# Patient Record
Sex: FEMALE | NOT HISPANIC OR LATINO | Employment: FULL TIME | URBAN - METROPOLITAN AREA
[De-identification: names, ages, dates, MRNs, and addresses within clinical notes are randomized per-mention and may not be internally consistent; named-entity substitution may affect disease eponyms.]

---

## 2021-02-16 ENCOUNTER — VIRTUAL VISIT (OUTPATIENT)
Dept: DERMATOLOGY | Facility: CLINIC | Age: 36
End: 2021-02-16
Payer: COMMERCIAL

## 2021-02-16 DIAGNOSIS — L81.1 MELASMA: Primary | ICD-10-CM

## 2021-02-16 PROCEDURE — 99204 OFFICE O/P NEW MOD 45 MIN: CPT | Mod: 95 | Performed by: DERMATOLOGY

## 2021-02-16 RX ORDER — HYDROQUINONE 40 MG/G
CREAM TOPICAL
Qty: 28.35 G | Refills: 3 | Status: SHIPPED | OUTPATIENT
Start: 2021-02-16 | End: 2023-01-25

## 2021-02-16 ASSESSMENT — PAIN SCALES - GENERAL: PAINLEVEL: NO PAIN (0)

## 2021-02-16 NOTE — LETTER
2/16/2021         RE: Richard Dudley  [address]  [facility] [address]        Dear Colleague,    Thank you for referring your patient, Richard Dudley, to the [facility]. Please see a copy of my visit note below.              [facility] Dermatology Note - [phone]  Encounter Date: Feb 16, 2021  Store-and-Forward and Telephone. Location of teledermatologist: [facility].  Start time: 2:27PM. End time: 2:37PM.    Dermatology Problem List:  1. Melasma  -current tx: sunscreen, hydroquinone 4% cream BID 3 months on, one month off, hold if pregnant    Social history: Has nearly 2 year old daughter. Planning for more kids in the future but not soon.    ____________________________________________    Assessment & Plan:  # Melasma - counseled patient on the etiology of melasma. Chronic condition, currently flared with no treatment. Advised patient to switch to physical blocker sunscreen, ideally with iron oxide. Also advised to stop hydroquinone if she becomes pregnant.   - Apply Skinceuticals Fusion sunscreen daily. In the summer, reapply frequently.   - Start hydroquinone 4% cream twice daily to the cheeks for 3 months, then stop and take a one month break. Okay to repeat cycle if needed. Hold if prengant, not safe.   - Discussed chronic nature of melasma. There is no cure but can be improved with treatment.       Procedures Performed:   None.    Follow-up: prn for new or changing lesions    Staff and Scribe:     Scribe Disclosure:   I, Varun Mccallum, am serving as a scribe to document services personally performed by this physician, Dr. Niki Rivera, based on data collection and the provider's statements to me.     Provider Disclosure:   The documentation recorded by the scribe accurately reflects the services I personally performed and the decisions made by me.    Niki Rivera MD    Department of  Dermatology  [facility]: Phone: [phone], Fax:[phone]  [facility]: Phone: [phone], Fax: [phone]    ____________________________________________    CC: Derm Problem (Richard is having a consult for dark patches on her face, showed up around month 5 of pregnancy currently, no medications or creams. )    HPI:  Ms. Richard Dudley is a(n) 35 year old female who presents today as a new patient for dark patches on her face.    She is new to our department.     Self referred.    Today, patient notes concerns about dark patches on her face that showed up during pregnancy. Her child is 22 months old, and she notes this occurred during her 5th month of pregnancy. She is planning to have more children in the future, but is planning to wait a bit before pregnancy. She has not noticed similar areas on the chin. She does use Derm[employer]ica Sun Screen, the dermatologic dynamic skin recovery type.    Patient is otherwise feeling well, without additional concerns.    Labs:  NA    Physical Exam:  Vitals: There were no vitals taken for this visit.  SKIN: Teledermatology photos were reviewed; image quality and interpretability: acceptable.   - reticular hyperpigmentation on the bilateral cheeks  - No other lesions of concern on areas examined.     Medications:  No current outpatient medications on file.     No current facility-administered medications for this visit.       Past Medical History:   There is no problem list on file for this patient.    No past medical history on file.     CC No referring provider defined for this encounter. on close of this encounter.    Teledermatology Nurse Call Patients:     Are you  in the [facility] at the time of the encounter? yes    Today's visit will be billed to you and your insurance.    A teledermatology visit is not as thorough as an in-person visit and the quality of  the photograph sent may not be of the same quality as that taken by the dermatology clinic.      Patient summary of issue : Dark patches on face  Location of problem on body: Face  How long has area/symptoms been present: almost two years  What makes it better?:nothing   What makes it worse?: nothing makes it worse   Other symptoms include the following: just darkening of the skin   Which medications have been tried, for how long, and did they make it better or worse (ex. Triamcinolone, used twice daily for 2 weeks, not improved):n/a  The patient has not seen a dermatologist.   The patient hasno past medical history of skin cancer  ROS: The patient is generally feeling well.               Again, thank you for allowing me to participate in the care of your patient.        Sincerely,        Niki Rivera MD

## 2021-02-16 NOTE — NURSING NOTE
Richard Dudley's goals for this visit include:   Chief Complaint   Patient presents with     Derm Problem     Richard is having a consult for dark patches on her face, showed up around month 5 of pregnancy currently, no medications or creams.        She requests these members of her care team be copied on today's visit information:     PCP: No Ref-Primary, Physician    Referring Provider:  No referring provider defined for this encounter.    There were no vitals taken for this visit.    Do you need any medication refills at today's visit? No    Roxy Flores LPN

## 2021-02-16 NOTE — PROGRESS NOTES
[facility] Dermatology Note - [phone]  Encounter Date: Feb 16, 2021  Store-and-Forward and Telephone. Location of teledermatologist: [facility].  Start time: 2:27PM. End time: 2:37PM.    Dermatology Problem List:  1. Melasma  -current tx: sunscreen, hydroquinone 4% cream BID 3 months on, one month off, hold if pregnant    Social history: Has nearly 2 year old daughter. Planning for more kids in the future but not soon.    ____________________________________________    Assessment & Plan:  # Melasma - counseled patient on the etiology of melasma. Chronic condition, currently flared with no treatment. Advised patient to switch to physical blocker sunscreen, ideally with iron oxide. Also advised to stop hydroquinone if she becomes pregnant.   - Apply Skinceuticals Fusion sunscreen daily. In the summer, reapply frequently.   - Start hydroquinone 4% cream twice daily to the cheeks for 3 months, then stop and take a one month break. Okay to repeat cycle if needed. Hold if prengant, not safe.   - Discussed chronic nature of melasma. There is no cure but can be improved with treatment.       Procedures Performed:   None.    Follow-up: prn for new or changing lesions    Staff and Scribe:     Scribe Disclosure:   I, Varun Mccallum, am serving as a scribe to document services personally performed by this physician, Dr. Niki Rivera, based on data collection and the provider's statements to me.     Provider Disclosure:   The documentation recorded by the scribe accurately reflects the services I personally performed and the decisions made by me.    Niki Rivera MD    Department of Dermatology  [facility]: Phone: [phone], Fax:[phone]  [facility]: Phone: [phone], Fax:  [phone]    ____________________________________________    CC: Derm Problem (Richard is having a consult for dark patches on her face, showed up around month 5 of pregnancy currently, no medications or creams. )    HPI:  Ms. Richard Dudley is a(n) 35 year old female who presents today as a new patient for dark patches on her face.    She is new to our department.     Self referred.    Today, patient notes concerns about dark patches on her face that showed up during pregnancy. Her child is 22 months old, and she notes this occurred during her 5th month of pregnancy. She is planning to have more children in the future, but is planning to wait a bit before pregnancy. She has not noticed similar areas on the chin. She does use Dermologica Sun Screen, the dermatologic dynamic skin recovery type.    Patient is otherwise feeling well, without additional concerns.    Labs:  NA    Physical Exam:  Vitals: There were no vitals taken for this visit.  SKIN: Teledermatology photos were reviewed; image quality and interpretability: acceptable.   - reticular hyperpigmentation on the bilateral cheeks  - No other lesions of concern on areas examined.     Medications:  No current outpatient medications on file.     No current facility-administered medications for this visit.       Past Medical History:   There is no problem list on file for this patient.    No past medical history on file.     CC No referring provider defined for this encounter. on close of this encounter.    Teledermatology Nurse Call Patients:     Are you  in the [facility] at the time of the encounter? yes    Today's visit will be billed to you and your insurance.    A teledermatology visit is not as thorough as an in-person visit and the quality of the photograph sent may not be of the same quality as that taken by the dermatology clinic.      Patient summary of issue : Dark patches on face  Location of problem on body: Face  How long has area/symptoms  been present: almost two years  What makes it better?:nothing   What makes it worse?: nothing makes it worse   Other symptoms include the following: just darkening of the skin   Which medications have been tried, for how long, and did they make it better or worse (ex. Triamcinolone, used twice daily for 2 weeks, not improved):n/a  The patient has not seen a dermatologist.   The patient hasno past medical history of skin cancer  ROS: The patient is generally feeling well.

## 2021-02-16 NOTE — Clinical Note
Please call patient to tell her how to use [employer] to get cheaper price on hydroquinone at [facility].    Thanks!    Niki Rivera MD    Department of Dermatology  [facility]: Phone: [phone], Fax:[phone]  [facility]: Phone: [phone], Fax: [phone]

## 2021-02-16 NOTE — PATIENT INSTRUCTIONS
-Apply sunscreen daily. I recommend using Skinceuticals Fusion sunscreen, available from www.dermstore.com    -Start hydroquinone twice daily to the cheeks for 3 months, then stop for one month. You can repeat this cycle as needed. If you become pregnant, stop using hydroquinone. This is likely not covered by insurance. You can access [employer] to get this for around $40.

## 2021-02-17 ENCOUNTER — TELEPHONE (OUTPATIENT)
Dept: DERMATOLOGY | Facility: CLINIC | Age: 36
End: 2021-02-17

## 2021-02-17 NOTE — TELEPHONE ENCOUNTER
Received fax from [employer] requesting a PA be initiated on Hydroquinone. Due to insurance typically not covering this medication pt to be advised to go to [employer] website and print off a discount coupon to help with the cost of this medication. Pt called, no answer. Left general message for pt to call the clinic back at [phone]...Lisette Huertas RN

## 2021-02-17 NOTE — TELEPHONE ENCOUNTER
Pt returned the call and notified of message below. Pt verbalized understanding and had no further questions at this time...Lisette Huertas RN

## 2021-02-28 ENCOUNTER — HEALTH MAINTENANCE LETTER (OUTPATIENT)
Age: 36
End: 2021-02-28

## 2021-10-03 ENCOUNTER — HEALTH MAINTENANCE LETTER (OUTPATIENT)
Age: 36
End: 2021-10-03

## 2022-03-20 ENCOUNTER — HEALTH MAINTENANCE LETTER (OUTPATIENT)
Age: 37
End: 2022-03-20

## 2022-09-11 ENCOUNTER — HEALTH MAINTENANCE LETTER (OUTPATIENT)
Age: 37
End: 2022-09-11

## 2023-01-25 ENCOUNTER — OFFICE VISIT (OUTPATIENT)
Dept: FAMILY MEDICINE | Facility: CLINIC | Age: 38
End: 2023-01-25
Payer: COMMERCIAL

## 2023-01-25 VITALS
HEART RATE: 73 BPM | SYSTOLIC BLOOD PRESSURE: 113 MMHG | WEIGHT: 106.8 LBS | DIASTOLIC BLOOD PRESSURE: 78 MMHG | TEMPERATURE: 98.4 F | OXYGEN SATURATION: 100 % | BODY MASS INDEX: 20.16 KG/M2 | HEIGHT: 61 IN | RESPIRATION RATE: 18 BRPM

## 2023-01-25 DIAGNOSIS — Z12.4 CERVICAL CANCER SCREENING: ICD-10-CM

## 2023-01-25 DIAGNOSIS — R53.83 FATIGUE, UNSPECIFIED TYPE: ICD-10-CM

## 2023-01-25 DIAGNOSIS — E55.9 VITAMIN D DEFICIENCY: ICD-10-CM

## 2023-01-25 DIAGNOSIS — Z00.00 ROUTINE HISTORY AND PHYSICAL EXAMINATION OF ADULT: Primary | ICD-10-CM

## 2023-01-25 DIAGNOSIS — R79.0 LOW FERRITIN: ICD-10-CM

## 2023-01-25 DIAGNOSIS — Z11.59 NEED FOR HEPATITIS C SCREENING TEST: ICD-10-CM

## 2023-01-25 DIAGNOSIS — Z28.20 VACCINE REFUSED BY PATIENT: ICD-10-CM

## 2023-01-25 LAB
ALBUMIN SERPL-MCNC: 3.8 G/DL (ref 3.4–5)
ALP SERPL-CCNC: 55 U/L (ref 40–150)
ALT SERPL W P-5'-P-CCNC: 18 U/L (ref 0–50)
ANION GAP SERPL CALCULATED.3IONS-SCNC: 3 MMOL/L (ref 3–14)
AST SERPL W P-5'-P-CCNC: 22 U/L (ref 0–45)
BILIRUB SERPL-MCNC: 0.3 MG/DL (ref 0.2–1.3)
BUN SERPL-MCNC: 14 MG/DL (ref 7–30)
CALCIUM SERPL-MCNC: 8.6 MG/DL (ref 8.5–10.1)
CHLORIDE BLD-SCNC: 110 MMOL/L (ref 94–109)
CHOLEST SERPL-MCNC: 188 MG/DL
CO2 SERPL-SCNC: 28 MMOL/L (ref 20–32)
CREAT SERPL-MCNC: 0.47 MG/DL (ref 0.52–1.04)
ERYTHROCYTE [DISTWIDTH] IN BLOOD BY AUTOMATED COUNT: 13.5 % (ref 10–15)
FASTING STATUS PATIENT QL REPORTED: NO
FERRITIN SERPL-MCNC: 11 NG/ML (ref 12–150)
GFR SERPL CREATININE-BSD FRML MDRD: >90 ML/MIN/1.73M2
GLUCOSE BLD-MCNC: 92 MG/DL (ref 70–99)
HCT VFR BLD AUTO: 34.9 % (ref 35–47)
HDLC SERPL-MCNC: 70 MG/DL
HGB BLD-MCNC: 11.5 G/DL (ref 11.7–15.7)
LDLC SERPL CALC-MCNC: 108 MG/DL
MCH RBC QN AUTO: 28.5 PG (ref 26.5–33)
MCHC RBC AUTO-ENTMCNC: 33 G/DL (ref 31.5–36.5)
MCV RBC AUTO: 86 FL (ref 78–100)
NONHDLC SERPL-MCNC: 118 MG/DL
PLATELET # BLD AUTO: 294 10E3/UL (ref 150–450)
POTASSIUM BLD-SCNC: 3.7 MMOL/L (ref 3.4–5.3)
PROT SERPL-MCNC: 7.6 G/DL (ref 6.8–8.8)
RBC # BLD AUTO: 4.04 10E6/UL (ref 3.8–5.2)
SODIUM SERPL-SCNC: 141 MMOL/L (ref 133–144)
TRIGL SERPL-MCNC: 50 MG/DL
TSH SERPL DL<=0.005 MIU/L-ACNC: 3.38 MU/L (ref 0.4–4)
VIT B12 SERPL-MCNC: 480 PG/ML (ref 232–1245)
WBC # BLD AUTO: 3.3 10E3/UL (ref 4–11)

## 2023-01-25 PROCEDURE — 85027 COMPLETE CBC AUTOMATED: CPT | Performed by: PREVENTIVE MEDICINE

## 2023-01-25 PROCEDURE — 84443 ASSAY THYROID STIM HORMONE: CPT | Performed by: PREVENTIVE MEDICINE

## 2023-01-25 PROCEDURE — 99385 PREV VISIT NEW AGE 18-39: CPT | Performed by: PREVENTIVE MEDICINE

## 2023-01-25 PROCEDURE — 87624 HPV HI-RISK TYP POOLED RSLT: CPT | Performed by: PREVENTIVE MEDICINE

## 2023-01-25 PROCEDURE — 36415 COLL VENOUS BLD VENIPUNCTURE: CPT | Performed by: PREVENTIVE MEDICINE

## 2023-01-25 PROCEDURE — 80053 COMPREHEN METABOLIC PANEL: CPT | Performed by: PREVENTIVE MEDICINE

## 2023-01-25 PROCEDURE — 82607 VITAMIN B-12: CPT | Performed by: PREVENTIVE MEDICINE

## 2023-01-25 PROCEDURE — 82728 ASSAY OF FERRITIN: CPT | Performed by: PREVENTIVE MEDICINE

## 2023-01-25 PROCEDURE — G0145 SCR C/V CYTO,THINLAYER,RESCR: HCPCS | Performed by: PREVENTIVE MEDICINE

## 2023-01-25 PROCEDURE — 80061 LIPID PANEL: CPT | Performed by: PREVENTIVE MEDICINE

## 2023-01-25 PROCEDURE — 82306 VITAMIN D 25 HYDROXY: CPT | Performed by: PREVENTIVE MEDICINE

## 2023-01-25 PROCEDURE — 86803 HEPATITIS C AB TEST: CPT | Performed by: PREVENTIVE MEDICINE

## 2023-01-25 PROCEDURE — 99203 OFFICE O/P NEW LOW 30 MIN: CPT | Mod: 25 | Performed by: PREVENTIVE MEDICINE

## 2023-01-25 ASSESSMENT — ENCOUNTER SYMPTOMS
EYE PAIN: 0
CHILLS: 0
MYALGIAS: 1
NAUSEA: 0
HEMATOCHEZIA: 0
WEAKNESS: 1
ARTHRALGIAS: 0
COUGH: 0
SHORTNESS OF BREATH: 0
FREQUENCY: 0
FEVER: 0
JOINT SWELLING: 0
DIZZINESS: 1
BREAST MASS: 0
SORE THROAT: 0
DYSURIA: 0
HEADACHES: 1
DIARRHEA: 0
NERVOUS/ANXIOUS: 1
ABDOMINAL PAIN: 0
HEARTBURN: 0
HEMATURIA: 0
PALPITATIONS: 0

## 2023-01-25 ASSESSMENT — PAIN SCALES - GENERAL: PAINLEVEL: NO PAIN (0)

## 2023-01-25 NOTE — PROGRESS NOTES
SUBJECTIVE:   CC: Richard is an 37 year old who presents for preventive health visit.   Patient has been advised of split billing requirements and indicates understanding: Yes  Healthy Habits:     Getting at least 3 servings of Calcium per day:  Yes    Bi-annual eye exam:  NO    Dental care twice a year:  Yes    Sleep apnea or symptoms of sleep apnea:  None    Diet:  Regular (no restrictions)    Frequency of exercise:  None    Taking medications regularly:  Yes    Medication side effects:  None    PHQ-2 Total Score: 2    Additional concerns today:  No        Fatigue:  -main reason for visit  -for a couple of years  -not enough sleep at night  -one child  -baby is 3.5 years   -Has had hair loss   -no new bowel changes  -no new joint pains  -no melena or rectal bleeding  -no fever or night sweats     Today's PHQ-2 Score:   PHQ-2 ( 1999 [employer]) 1/25/2023   Q1: Little interest or pleasure in doing things 1   Q2: Feeling down, depressed or hopeless 1   PHQ-2 Score 2   PHQ-2 Total Score (12-17 Years)- Positive if 3 or more points; Administer PHQ-A if positive -   Q1: Little interest or pleasure in doing things Several days   Q2: Feeling down, depressed or hopeless Several days   PHQ-2 Score 2       Have you ever done Advance Care Planning? (For example, a Health Directive, POLST, or a discussion with a medical provider or your loved ones about your wishes): No, advance care planning information given to patient to review.  Patient declined advance care planning discussion at this time.    Social History     Tobacco Use     Smoking status: Never     Smokeless tobacco: Never   Substance Use Topics     Alcohol use: No     If you drink alcohol do you typically have >3 drinks per day or >7 drinks per week? No    Alcohol Use 1/25/2023   Prescreen: >3 drinks/day or >7 drinks/week? No       Reviewed orders with patient.  Reviewed health maintenance and updated orders accordingly - Yes  Lab work is in process  Labs reviewed in  EPIC  BP Readings from Last 3 Encounters:   01/25/23 113/78   12/29/14 108/68    Wt Readings from Last 3 Encounters:   01/25/23 48.4 kg (106 lb 12.8 oz)   12/29/14 46 kg (101 lb 6.4 oz)                  There is no problem list on file for this patient.    History reviewed. No pertinent surgical history.    Social History     Tobacco Use     Smoking status: Never     Smokeless tobacco: Never   Substance Use Topics     Alcohol use: No     History reviewed. No pertinent family history.      Current Outpatient Medications   Medication Sig Dispense Refill     vitamin D3 (CHOLECALCIFEROL) 1.25 MG (15499 UT) capsule Take 1 capsule (50,000 Units) by mouth every 7 days for 8 doses 8 capsule 0     No Known Allergies    Breast Cancer Screening:  Any new diagnosis of family breast, ovarian, or bowel cancer? No    FHS-7: No flowsheet data found.    Patient under 40 years of age: Routine Mammogram Screening not recommended.   Pertinent mammograms are reviewed under the imaging tab.    History of abnormal Pap smear: NO - age 30-65 PAP every 5 years with negative HPV co-testing recommended     Reviewed and updated as needed this visit by clinical staff   Tobacco  Allergies  Meds  Problems  Med Hx  Surg Hx  Fam Hx          Reviewed and updated as needed this visit by Provider   Tobacco  Allergies  Meds  Problems  Med Hx  Surg Hx  Fam Hx         History reviewed. No pertinent past medical history.   History reviewed. No pertinent surgical history.    Review of Systems   Constitutional: Negative for chills and fever.   HENT: Positive for ear pain. Negative for congestion, hearing loss and sore throat.    Eyes: Negative for pain and visual disturbance.   Respiratory: Negative for cough and shortness of breath.    Cardiovascular: Negative for chest pain, palpitations and peripheral edema.   Gastrointestinal: Negative for abdominal pain, diarrhea, heartburn, hematochezia and nausea.   Breasts:  Negative for tenderness,  "breast mass and discharge.   Genitourinary: Negative for dysuria, frequency, genital sores, hematuria, pelvic pain, urgency, vaginal bleeding and vaginal discharge.   Musculoskeletal: Positive for myalgias. Negative for arthralgias and joint swelling.   Skin: Negative for rash.   Neurological: Positive for dizziness, weakness and headaches.   Psychiatric/Behavioral: Positive for mood changes. The patient is nervous/anxious.         OBJECTIVE:   /78 (BP Location: Left arm, Patient Position: Sitting, Cuff Size: Adult Regular)   Pulse 73   Temp 98.4  F (36.9  C) (Tympanic)   Resp 18   Ht 1.549 m (5' 1\")   Wt 48.4 kg (106 lb 12.8 oz)   LMP 01/23/2023 (Exact Date)   SpO2 100%   BMI 20.18 kg/m    Physical Exam  GENERAL: healthy, alert and no distress  EYES: Eyes grossly normal to inspection, PERRL and conjunctivae and sclerae normal  HENT: ear canals and TM's normal, nose and mouth without ulcers or lesions  NECK: no adenopathy, no asymmetry  RESP: lungs clear to auscultation - no rales, rhonchi or wheezes  CV: regular rate and rhythm, normal S1 S2, no S3 or S4, no murmur, click or rub, no peripheral edema and peripheral pulses strong  ABDOMEN: soft, nontender, no hepatosplenomegaly, no masses and bowel sounds normal   (female): 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+, normal urethral meatus, vaginal mucosa pink, moist, well rugated, and normal cervix without masses or discharge  MS: no gross musculoskeletal defects noted, no edema  SKIN: no suspicious lesions or rashes  NEURO: Normal strength and tone, mentation intact and speech normal  PSYCH: mentation appears normal, affect normal/bright    Diagnostic Test Results:  Labs reviewed in Epic  Results for orders placed or performed in visit on 01/25/23   Hepatitis C Screen Reflex to HCV RNA Quant and Genotype     Status: Normal   Result Value Ref Range    Hepatitis C Antibody Nonreactive Nonreactive    Narrative    Assay performance characteristics have not been " established for newborns, infants, and children.   Lipid panel reflex to direct LDL Non-fasting     Status: Abnormal   Result Value Ref Range    Cholesterol 188 <200 mg/dL    Triglycerides 50 <150 mg/dL    Direct Measure HDL 70 >=50 mg/dL    LDL Cholesterol Calculated 108 (H) <=100 mg/dL    Non HDL Cholesterol 118 <130 mg/dL    Patient Fasting > 8hrs? No     Narrative    Cholesterol  Desirable:  <200 mg/dL    Triglycerides  Normal:  Less than 150 mg/dL  Borderline High:  150-199 mg/dL  High:  200-499 mg/dL  Very High:  Greater than or equal to 500 mg/dL    Direct Measure HDL  Female:  Greater than or equal to 50 mg/dL   Male:  Greater than or equal to 40 mg/dL    LDL Cholesterol  Desirable:  <100mg/dL  Above Desirable:  100-129 mg/dL   Borderline High:  130-159 mg/dL   High:  160-189 mg/dL   Very High:  >= 190 mg/dL    Non HDL Cholesterol  Desirable:  130 mg/dL  Above Desirable:  130-159 mg/dL  Borderline High:  160-189 mg/dL  High:  190-219 mg/dL  Very High:  Greater than or equal to 220 mg/dL   CBC with platelets     Status: Abnormal   Result Value Ref Range    WBC Count 3.3 (L) 4.0 - 11.0 10e3/uL    RBC Count 4.04 3.80 - 5.20 10e6/uL    Hemoglobin 11.5 (L) 11.7 - 15.7 g/dL    Hematocrit 34.9 (L) 35.0 - 47.0 %    MCV 86 78 - 100 fL    MCH 28.5 26.5 - 33.0 pg    MCHC 33.0 31.5 - 36.5 g/dL    RDW 13.5 10.0 - 15.0 %    Platelet Count 294 150 - 450 10e3/uL   Comprehensive metabolic panel     Status: Abnormal   Result Value Ref Range    Sodium 141 133 - 144 mmol/L    Potassium 3.7 3.4 - 5.3 mmol/L    Chloride 110 (H) 94 - 109 mmol/L    Carbon Dioxide (CO2) 28 20 - 32 mmol/L    Anion Gap 3 3 - 14 mmol/L    Urea Nitrogen 14 7 - 30 mg/dL    Creatinine 0.47 (L) 0.52 - 1.04 mg/dL    Calcium 8.6 8.5 - 10.1 mg/dL    Glucose 92 70 - 99 mg/dL    Alkaline Phosphatase 55 40 - 150 U/L    AST 22 0 - 45 U/L    ALT 18 0 - 50 U/L    Protein Total 7.6 6.8 - 8.8 g/dL    Albumin 3.8 3.4 - 5.0 g/dL    Bilirubin Total 0.3 0.2 - 1.3 mg/dL     GFR Estimate >90 >60 mL/min/1.73m2   TSH with free T4 reflex     Status: Normal   Result Value Ref Range    TSH 3.38 0.40 - 4.00 mU/L   Vitamin D Deficiency     Status: Abnormal   Result Value Ref Range    Vitamin D, Total (25-Hydroxy) 14 (L) 20 - 75 ug/L    Narrative    Season, race, dietary intake, and treatment affect the concentration of 25-hydroxy-Vitamin D. Values may decrease during winter months and increase during summer months. Values 20-29 ug/L may indicate Vitamin D insufficiency and values <20 ug/L may indicate Vitamin D deficiency.    Vitamin D determination is routinely performed by an immunoassay specific for 25 hydroxyvitamin D3.  If an individual is on vitamin D2(ergocalciferol) supplementation, please specify 25 OH vitamin D2 and D3 level determination by LCMSMS test VITD23.     Vitamin B12     Status: Normal   Result Value Ref Range    Vitamin B12 480 232 - 1,245 pg/mL   Ferritin     Status: Abnormal   Result Value Ref Range    Ferritin 11 (L) 12 - 150 ng/mL       ASSESSMENT/PLAN:   Richard was seen today for physical.    Diagnoses and all orders for this visit:    Routine history and physical examination of adult  -     REVIEW OF HEALTH MAINTENANCE PROTOCOL ORDERS  -     Lipid panel reflex to direct LDL Non-fasting    Need for hepatitis C screening test  -     Screening guidelines reviewed   -     Hepatitis C Screen Reflex to HCV RNA Quant and Genotype    Cervical cancer screening  -     Pap Screen with HPV - recommended age 30 - 65 years  -     HPV Hold (Lab Only)  -screening guidelines reviewed     Fatigue, unspecified type  -     Await all labs   -     CBC with platelets  -     Comprehensive metabolic panel  -     TSH with free T4 reflex  -     Vitamin D Deficiency  -     Vitamin B12  -     Ferritin    Vaccine refused by patient  -declined routine vaccines including Flu and Covid     Vitamin D deficiency  -     vitamin D3 (CHOLECALCIFEROL) 1.25 MG (81352 UT) capsule; Take 1 capsule (50,000  Units) by mouth every 7 days for 8 doses    Low ferritin  -take Ferrous Gluconate 325 mg daily over the counter   -recheck labs in 3 months     COUNSELING:  Reviewed preventive health counseling, as reflected in patient instructions       Regular exercise       Healthy diet/nutrition       Vision screening        She reports that she has never smoked. She has never used smokeless tobacco.          Sayra Tariq MD MPH    [facility]

## 2023-01-26 LAB
DEPRECATED CALCIDIOL+CALCIFEROL SERPL-MC: 14 UG/L (ref 20–75)
HCV AB SERPL QL IA: NONREACTIVE

## 2023-01-28 NOTE — RESULT ENCOUNTER NOTE
Lensa,     Screening test for Hepatitis C is negative.    Your vitamin D level was low. Low levels can cause fatigue and joint pains. I recommend starting high dose vitamin D.  I have sent a prescription for vitamin D 50,000IU to your pharmacy for you to . You should take this once weekly for 8 weeks, then take over the counter Vitamin D3 at a dose of 2000 units daily.     Vitamin B 12 and Thyroid function are normal.  Electrolytes, glucose, kidney function and liver function tests are normal.  Cholesterol is at goal for you.    Basic blood count is showing slight anemia and low iron stores. Please take over the counter Ferrous Gluconate 325 mg daily for 3 months.       Please do not hesitate to call us at [phone] if you have any questions or concerns.    Thank you,    Sayra Tariq MD MPH

## 2023-01-30 LAB
BKR LAB AP GYN ADEQUACY: NORMAL
BKR LAB AP GYN INTERPRETATION: NORMAL
BKR LAB AP HPV REFLEX: NORMAL
BKR LAB AP PREVIOUS ABNORMAL: NORMAL
HUMAN PAPILLOMA VIRUS 16 DNA: NEGATIVE
HUMAN PAPILLOMA VIRUS 18 DNA: NEGATIVE
HUMAN PAPILLOMA VIRUS FINAL DIAGNOSIS: NORMAL
HUMAN PAPILLOMA VIRUS OTHER HR: NEGATIVE
PATH REPORT.COMMENTS IMP SPEC: NORMAL
PATH REPORT.COMMENTS IMP SPEC: NORMAL
PATH REPORT.RELEVANT HX SPEC: NORMAL

## 2023-04-07 DIAGNOSIS — E55.9 VITAMIN D DEFICIENCY: ICD-10-CM

## 2023-04-10 RX ORDER — METHOCARBAMOL 750 MG/1
TABLET ORAL
Qty: 8 CAPSULE | Refills: 0 | OUTPATIENT
Start: 2023-04-10

## 2023-04-10 NOTE — TELEPHONE ENCOUNTER
High dose Vitamin D no longer indicated. Patient can take over the counter Vitamin D 3 in a dose of 2000 units daily. Thank you, Sayra Tariq MD MPH

## 2024-03-19 ENCOUNTER — OFFICE VISIT (OUTPATIENT)
Dept: FAMILY MEDICINE | Facility: CLINIC | Age: 39
End: 2024-03-19
Payer: COMMERCIAL

## 2024-03-19 VITALS
HEIGHT: 60 IN | BODY MASS INDEX: 20.54 KG/M2 | WEIGHT: 104.6 LBS | HEART RATE: 72 BPM | SYSTOLIC BLOOD PRESSURE: 103 MMHG | OXYGEN SATURATION: 99 % | DIASTOLIC BLOOD PRESSURE: 73 MMHG | TEMPERATURE: 97.4 F | RESPIRATION RATE: 14 BRPM

## 2024-03-19 DIAGNOSIS — H65.193 ACUTE EFFUSION OF BOTH MIDDLE EARS: ICD-10-CM

## 2024-03-19 DIAGNOSIS — Z30.011 ENCOUNTER FOR PRESCRIPTION OF ORAL CONTRACEPTIVES: ICD-10-CM

## 2024-03-19 DIAGNOSIS — E55.9 VITAMIN D DEFICIENCY: ICD-10-CM

## 2024-03-19 DIAGNOSIS — Z00.00 ROUTINE GENERAL MEDICAL EXAMINATION AT A HEALTH CARE FACILITY: Primary | ICD-10-CM

## 2024-03-19 LAB — HCG UR QL: NEGATIVE

## 2024-03-19 PROCEDURE — 82306 VITAMIN D 25 HYDROXY: CPT | Performed by: PREVENTIVE MEDICINE

## 2024-03-19 PROCEDURE — 36415 COLL VENOUS BLD VENIPUNCTURE: CPT | Performed by: PREVENTIVE MEDICINE

## 2024-03-19 PROCEDURE — 99213 OFFICE O/P EST LOW 20 MIN: CPT | Mod: 25 | Performed by: PREVENTIVE MEDICINE

## 2024-03-19 PROCEDURE — 80061 LIPID PANEL: CPT | Performed by: PREVENTIVE MEDICINE

## 2024-03-19 PROCEDURE — 99395 PREV VISIT EST AGE 18-39: CPT | Performed by: PREVENTIVE MEDICINE

## 2024-03-19 PROCEDURE — 82947 ASSAY GLUCOSE BLOOD QUANT: CPT | Performed by: PREVENTIVE MEDICINE

## 2024-03-19 PROCEDURE — 81025 URINE PREGNANCY TEST: CPT | Performed by: PREVENTIVE MEDICINE

## 2024-03-19 RX ORDER — FLUTICASONE PROPIONATE 50 MCG
SPRAY, SUSPENSION (ML) NASAL
Qty: 48 G | OUTPATIENT
Start: 2024-03-19

## 2024-03-19 RX ORDER — FLUTICASONE PROPIONATE 50 MCG
1-2 SPRAY, SUSPENSION (ML) NASAL DAILY
Qty: 16 G | Refills: 0 | Status: SHIPPED | OUTPATIENT
Start: 2024-03-19 | End: 2024-04-17

## 2024-03-19 RX ORDER — DESOGESTREL AND ETHINYL ESTRADIOL 0.15-0.03
1 KIT ORAL DAILY
Qty: 84 TABLET | Refills: 4 | Status: SHIPPED | OUTPATIENT
Start: 2024-03-19

## 2024-03-19 SDOH — HEALTH STABILITY: PHYSICAL HEALTH: ON AVERAGE, HOW MANY DAYS PER WEEK DO YOU ENGAGE IN MODERATE TO STRENUOUS EXERCISE (LIKE A BRISK WALK)?: 1 DAY

## 2024-03-19 SDOH — HEALTH STABILITY: PHYSICAL HEALTH: ON AVERAGE, HOW MANY MINUTES DO YOU ENGAGE IN EXERCISE AT THIS LEVEL?: 0 MIN

## 2024-03-19 ASSESSMENT — SOCIAL DETERMINANTS OF HEALTH (SDOH): HOW OFTEN DO YOU GET TOGETHER WITH FRIENDS OR RELATIVES?: THREE TIMES A WEEK

## 2024-03-19 NOTE — RESULT ENCOUNTER NOTE
Lensa,    Urine pregnancy test is negative.  Other labs are pending.     Please do not hesitate to call us at [phone] if you have any questions or concerns.    Thank you,    Sayra Tariq MD MPH

## 2024-03-19 NOTE — PATIENT INSTRUCTIONS
Preventive Care Advice   This is general advice given by our system to help you stay healthy. However, your care team may have specific advice just for you. Please talk to your care team about your preventive care needs.  Nutrition  Eat 5 or more servings of fruits and vegetables each day.  Try wheat bread, brown rice and whole grain pasta (instead of white bread, rice, and pasta).  Get enough calcium and vitamin D. Check the label on foods and aim for 100% of the RDA (recommended daily allowance).  Lifestyle  Exercise at least 150 minutes each week   (30 minutes a day, 5 days a week).  Do muscle strengthening activities 2 days a week. These help control your weight and prevent disease.  No smoking.  Wear sunscreen to prevent skin cancer.  Have a dental exam and cleaning every 6 months.  Yearly exams  See your health care team every year to talk about:  Any changes in your health.  Any medicines your care team has prescribed.  Preventive care, family planning, and ways to prevent chronic diseases.  Shots (vaccines)   HPV shots (up to age 26), if you've never had them before.  Hepatitis B shots (up to age 59), if you've never had them before.  COVID-19 shot: Get this shot when it's due.  Flu shot: Get a flu shot every year.  Tetanus shot: Get a tetanus shot every 10 years.  Pneumococcal, hepatitis A, and RSV shots: Ask your care team if you need these based on your risk.  Shingles shot (for age 50 and up).  General health tests  Diabetes screening:  Starting at age 35, Get screened for diabetes at least every 3 years.  If you are younger than age 35, ask your care team if you should be screened for diabetes.  Cholesterol test: At age 39, start having a cholesterol test every 5 years, or more often if advised.  Bone density scan (DEXA): At age 50, ask your care team if you should have this scan for osteoporosis (brittle bones).  Hepatitis C: Get tested at least once in your life.  STIs (sexually transmitted  infections)  Before age 24: Ask your care team if you should be screened for STIs.  After age 24: Get screened for STIs if you're at risk. You are at risk for STIs (including HIV) if:  You are sexually active with more than one person.  You don't use condoms every time.  You or a partner was diagnosed with a sexually transmitted infection.  If you are at risk for HIV, ask about PrEP medicine to prevent HIV.  Get tested for HIV at least once in your life, whether you are at risk for HIV or not.  Cancer screening tests  Cervical cancer screening: If you have a cervix, begin getting regular cervical cancer screening tests at age 21. Most people who have regular screenings with normal results can stop after age 65. Talk about this with your provider.  Breast cancer scan (mammogram): If you've ever had breasts, begin having regular mammograms starting at age 40. This is a scan to check for breast cancer.  Colon cancer screening: It is important to start screening for colon cancer at age 45.  Have a colonoscopy test every 10 years (or more often if you're at risk) Or, ask your provider about stool tests like a FIT test every year or Cologuard test every 3 years.  To learn more about your testing options, visit: [email or web address].  For help making a decision, visit: https://bit.ly/lw11840.  Prostate cancer screening test: If you have a prostate and are age 55 to 69, ask your provider if you would benefit from a yearly prostate cancer screening test.  Lung cancer screening: If you are a current or former smoker age 50 to 80, ask your care team if ongoing lung cancer screenings are right for you.  For informational purposes only. Not to replace the advice of your health care provider. Copyright   2023 [employer]. All rights reserved. Clinically reviewed by the [facility]. Goldbely [id number] - REV 01/24.

## 2024-03-19 NOTE — COMMUNITY RESOURCES LIST (PATIENT PREFERRED LANGUAGE)
March 19, 2024           [facility] ROSIBEL  ALEXANDRACRISTOPHERKATHY DHUKKUBA BELEMTERRYIGGY            SUDHA    uunfaa/Pio      Association - Offered Programs  [address] [place] [address] (Fageenya: 2.4 maayilii)  Weebsaayitii: [email or web address]/programs  Afaan: Michael Penatii: Rc Smith: Zohreh machado      of the [place] - Sports clubs and recreational activities - [facility]  [address] [place] [address] (Fageenya: 3.9 maayilii)  Afaan: Michael Humphreys: Pat Bach      [facility] - Adult Enrichment  Biltara: [phone]  Chadwicksaayitii: [email or web address]  Afaan: Michael Dye  Sa'aatiiwwan: Wii Sa'aatii 7:30 AM - 4:00 PM Kib Sa'aatii 7:30 AM - 4:00 PM Iona Sa'aatii 7:30 AM - 4:00 PM Victor Manuel Sa'aatii 7:30 AM - 4:00 PM Parker Sa'aatii 7:30 AM - 4:00 PM    [facility] - Offered Programs  [address] [place] [address] (Fageenya: 2.4 maayilii)  Chadwicksaayitii: [email or web address]/programs  Afaan: Michael Penatii: Kari Smithkirstendewayne Gandhi: Zohreh machado      Fitness - [place] - Group fitness classes - [employer] [facility] [place]  [address] [place] [address] (Fageenya: 3.5 maayilii)  Leann: [phone]  Afaan: Michael Dye  [facility]: Leonelaa, Insbrendarajamilii, Ofii [facility] - Care Coordination (Healthcare only)  Leann: [phone]  Nicholas: [email or web address]  Afaan: Matt Floydaatiiwwan: Iona Ralph'aatii 9:00 AM - 11:30 AM Victor Manuel 1:00 PM - 4:00 PM jeanieattRd acevedoaatii 5:30 - 7:00 PM               ELIJAH [place] [address] irratti ivelisse medrano  .   Kim Blount  211 [email or web address] Johns Creek Hospital.[email or web address] irrattcurtis oviedo argamu  .   To'annoo Summii  [phone] http://mnpoison.org [email or web address] Baptist Medical Center.org irratti ivelisse argamudha  .     Of [facility] Jedkk Leonelreenyaa  988 [email or web address] irratti ivelisse argamu  .   Childhelp [place]field bilbilaa Miidhaa Daa?immanii Biyyaalessaa  [phone] http://Sarara Daa?immanii.org   .   Toora bilbilaa Miidhaa Saalaa Biyyaalessaa  [phone] (FEMI) http://Rainn.org irratti ivelisse argamu   .     Garfield Nageenyaa Baqannaa Biyyaalessaa  [phone] (RUNAWAY) http://[address] [place] [address].org irratt ivelisse argamudha  .   Deeggarsa Ulfaa fi Da'umsa Booda  Bilbilaa/barreessuu [phone] [place]: http://ppsupportmn.org [place]: http://psichapters.com/wi  .   Garfield Melissapraveena Biyyaalessaa Giovanniyyadama Wantootaa ([facility]) .  [phone] ([phone]) http://wal?aansa barbaadi.gov   .                ITTI FUFIINSA: [employer] tajaajila kennitoota tarree qabeenya charlie keessatti caqafaman mathewyynichole thrasher. [employer] tajaajiloonni tarree qabeenya charlie keessatti caqafaman akka isiniif dhiyaatan ydaniel fayyaa ydaniel figueredoyyaa orianaan akka fotripyessuudixie bedolla.    [facility]

## 2024-03-19 NOTE — COMMUNITY RESOURCES LIST (ENGLISH)
March 19, 2024           YOUR PERSONALIZED LIST OF SERVICES & PROGRAMS           & RECREATION    Sports      Association - Offered Programs  [address] [place] [address] (Distance: 2.4 miles)  Website: [email or web address]://www.[facility]ent[facility] Healthbounce.io/programs  Language: English  Fee: Free, Self pay  Accessibility: Translation services      of the [place] - Sports clubs and recreational activities - [facility]  [address] [place] [address] (Distance: 3.9 miles)  Language: English  Fee: Self pay, Sliding scale      [facility] - Adult Enrichment  Phone: [phone]  Website: [email or web address]://Adify/adults-seniors/adult-enrichment/  Language: English  Hours: Mon 7:30 AM - 4:00 PM Tue 7:30 AM - 4:00 PM Wed 7:30 AM - 4:00 PM Thu 7:30 AM - 4:00 PM Fri 7:30 AM - 4:00 PM    Classes/Groups      Association - Offered Programs  [address] [place] [address] (Distance: 2.4 miles)  Website: https://www.[facility] Health[facility]ounce.io/programs  Language: English  Fee: Free, Self pay  Accessibility: Translation services      Fitness - [place] - Group fitness classes - [employer] [facility]  [address] [place] [address] (Distance: 3.5 miles)  Phone: [phone]  Language: English  Fee: Free, Insurance, Self pay      [facility] Services - Care Coordination (Healthcare only)  Phone: [phone]  Website: [email or web address] [email or web address].[email or web address]  Language: English, Icelandic  Hours: Wed 9:00 AM - 11:30 AM Thu 1:00 PM - 4:00 PM, 5:30 PM - 7:00 PM               IMPORTANT NUMBERS & WEBSITES        Emergency Services  911  .   [employer] Way  211 http://[email or web address]  .   Poison Control  [phone] http://[place]poison.org http://wisconsinpoison.org  .     Suicide and Crisis Lifeline  988 http://988lifeline.org  .   Childhelp [place] Child Abuse Hotline  171.524.8216 http://Childhelphotline.org   .   National Sexual Assault Hotline  (619) 949-5494 (HOPE)  http://Rainn.org   .     National Runaway Safeline  (364) 239-7891 (RUNAWAY) http://trip.merunaCreoPop.org  .   Pregnancy & Postpartum Support  Call/text 009-735-2109  [place]: http://ppsupportmn.org  [place]: http://psichapters.com/wi  .   Substance Abuse National Helpline ([facility])  708-725-HELP (6710) http://Findtreatment.gov   .                DISCLAIMER: [employer] does not endorse any service providers mentioned in this resource list. [employer] does not guarantee that the services mentioned in this resource list will be available to you or will improve your health or wellness.    [facility]

## 2024-03-19 NOTE — PROGRESS NOTES
Preventive Care Visit  [facility] IRVIN Tariq MD, Family Medicine  Mar 19, 2024      Assessment & Plan     Routine general medical examination at a health care facility  - REVIEW OF HEALTH MAINTENANCE PROTOCOL ORDERS  - Lipid panel reflex to direct LDL Non-fasting  - Glucose    Vitamin D deficiency  - Vitamin D Deficiency    Encounter for prescription of oral contraceptives  - desogestrel-ethinyl estradiol (APRI) 0.15-30 MG-MCG tablet  Dispense: 84 tablet; Refill: 4  - HCG Qual, Urine ([id number])    The following counseling on birth control pills was done:  Birth control pills are a medication you take every day to prevent pregnancy. If birth control pills are always used correctly, less than 1 out of 100 women using them will get pregnant each year. When you first start the pill, it takes several days to begin working. Be sure to use backup birth control (like a condom) for the first 7 days preferably till the first packet is completed.  The hormones in the pill keep your ovaries from releasing eggs and thicken your cervical mucus to block sperm from getting into the uterus.  Most women can get pregnant quickly when they stop using the pill.  Your periods may become lighter and less painful if you take the pill.  The hormones in pills offer health benefits. The pill can offer some protection against acne, non-cancerous breast growths, ectopic pregnancy, endometrial and ovarian cancers, iron deficiency anemia, and ovarian cysts.  Birth control pills do not protect against sexually transmitted infections (STIs). Some women may have side effects while using birth control pills. They include bleeding between periods, breast tenderness, and nausea. Some of the most common side effects only last for the first few months.   Risks discussed including risk for heart attack, stroke and blood clots.  Patient is not a smoker and has no personal or family history of blood clots/bleeding disorders.   Regular condom use is recommended to help protect against STIs.      Acute effusion of both middle ears  -possible perforation on left side  -no erythema  -water precautions reviewed   - Adult ENT  Referral  - fluticasone (FLONASE) 50 MCG/ACT nasal spray  Dispense: 16 g; Refill: 0    Ordering of each unique test  Prescription drug management  15 minutes spent by me on the date of the encounter doing chart review, history and exam, documentation and further activities per the note      Counseling  Appropriate preventive services were discussed with this patient, including applicable screening as appropriate for fall prevention, nutrition, physical activity, Tobacco-use cessation, weight loss and cognition.  Checklist reviewing preventive services available has been given to the patient.  Reviewed patient's diet, addressing concerns and/or questions.   She is at risk for lack of exercise and has been provided with information to increase physical activity for the benefit of her well-being.   The patient was instructed to see the dentist every 6 months.     Jerica Ramos is a 38 year old, presenting for the following:  Physical        3/19/2024     2:21 PM   Additional Questions   Roomed by ashley   Accompanied by self         3/19/2024     2:21 PM   Patient Reported Additional Medications   Patient reports taking the following new medications no        Health Care Directive  Patient does not have a Health Care Directive or Living Will: Discussed advance care planning with patient; information given to patient to review.    HPI    Both ears+  Feels uncomfortable+  Feels water in the ears  No hearing change  No bleeding  No drainage  May feel some buzzing  Used over the counter medication, used some ear drops    Birth control:  -has not used in the past  -used condoms in the past  -planning future pregnancies    No personal or family history of blood clots     LMP 3/9/24          3/19/2024   General Health    How would you rate your overall physical health? Good   Feel stress (tense, anxious, or unable to sleep) Not at all         3/19/2024   Nutrition   Three or more servings of calcium each day? Yes   Diet: Gluten-free/reduced   How many servings of fruit and vegetables per day? (!) 2-3   How many sweetened beverages each day? 0-1         3/19/2024   Exercise   Days per week of moderate/strenous exercise 1 day   Average minutes spent exercising at this level 0 min   (!) EXERCISE CONCERN      3/19/2024   Social Factors   Frequency of gathering with friends or relatives Three times a week   Worry food won't last until get money to buy more No   Food not last or not have enough money for food? No   Do you have housing?  Yes   Are you worried about losing your housing? No   Lack of transportation? No   Unable to get utilities (heat,electricity)? No         3/19/2024   Dental   Dentist two times every year? (!) NO         3/19/2024   TB Screening   Were you born outside of the [place]? Yes         Today's PHQ-2 Score:       3/19/2024     2:15 PM   PHQ-2 ( 1999 Pfizer)   Q1: Little interest or pleasure in doing things 0   Q2: Feeling down, depressed or hopeless 0   PHQ-2 Score 0   Q1: Little interest or pleasure in doing things Not at all   Q2: Feeling down, depressed or hopeless Not at all   PHQ-2 Score 0           3/19/2024   Substance Use   Alcohol more than 3/day or more than 7/wk No   Do you use any other substances recreationally? No     Social History     Tobacco Use    Smoking status: Never    Smokeless tobacco: Never   Vaping Use    Vaping Use: Never used   Substance Use Topics    Alcohol use: No    Drug use: No             3/19/2024   Breast Cancer Screening   Family history of breast, colon, or ovarian cancer? No / Unknown      Mammogram Screening - Patient under 40 years of age: Routine Mammogram Screening not recommended.         3/19/2024   STI Screening   New sexual partner(s) since last STI/HIV test? No      History of abnormal Pap smear: NO - age 30-65 PAP every 5 years with negative HPV co-testing recommended        Latest Ref Rng & Units 1/25/2023     4:25 PM   PAP / HPV   PAP  Negative for Intraepithelial Lesion or Malignancy (NILM)    HPV 16 DNA Negative Negative    HPV 18 DNA Negative Negative    Other HR HPV Negative Negative            3/19/2024   Contraception/Family Planning   Questions about contraception or family planning (!) YES         Reviewed and updated as needed this visit by Provider                    History reviewed. No pertinent past medical history.  History reviewed. No pertinent surgical history.  Lab work is in process  Labs reviewed in EPIC  BP Readings from Last 3 Encounters:   03/19/24 103/73   01/25/23 113/78   12/29/14 108/68    Wt Readings from Last 3 Encounters:   03/19/24 47.4 kg (104 lb 9.6 oz)   01/25/23 48.4 kg (106 lb 12.8 oz)   12/29/14 46 kg (101 lb 6.4 oz)                  There is no problem list on file for this patient.    History reviewed. No pertinent surgical history.    Social History     Tobacco Use    Smoking status: Never    Smokeless tobacco: Never   Substance Use Topics    Alcohol use: No     History reviewed. No pertinent family history.      Current Outpatient Medications   Medication Sig Dispense Refill    desogestrel-ethinyl estradiol (APRI) 0.15-30 MG-MCG tablet Take 1 tablet by mouth daily 84 tablet 4    fluticasone (FLONASE) 50 MCG/ACT nasal spray Spray 1-2 sprays into both nostrils daily Use for 2-3 weeks 16 g 0     No Known Allergies      Review of Systems  Constitutional, HEENT, cardiovascular, pulmonary, gi and gu systems are negative, except as otherwise noted.     Objective    Exam  /73 (BP Location: Left arm, Patient Position: Sitting, Cuff Size: Adult Regular)   Pulse 72   Temp 97.4  F (36.3  C) (Oral)   Resp 14   Ht 1.524 m (5')   Wt 47.4 kg (104 lb 9.6 oz)   LMP 03/09/2024 (Approximate)   SpO2 99%   BMI 20.43 kg/m     Estimated  body mass index is 20.43 kg/m  as calculated from the following:    Height as of this encounter: 1.524 m (5').    Weight as of this encounter: 47.4 kg (104 lb 9.6 oz).    Physical Exam  GENERAL APPEARANCE: healthy, alert and no distress  EYES: Eyes grossly normal to inspection and conjunctivae and sclerae normal  HENT: Effusion right side+ left side with perforation   RESP: lungs clear to auscultation - no rales, rhonchi or wheezes  CV: regular rates and rhythm, normal S1 S2, no S3 or S4 and no murmur, click or rub  MS: extremities normal- no gross deformities noted   SKIN: no suspicious lesions or rashes  NEURO: Normal strength and tone, mentation intact and speech normal  PSYCH: mentation appears normal          Signed Electronically by: Sayra Tariq MD

## 2024-03-20 LAB
CHOLEST SERPL-MCNC: 186 MG/DL
FASTING STATUS PATIENT QL REPORTED: YES
FASTING STATUS PATIENT QL REPORTED: YES
GLUCOSE SERPL-MCNC: 87 MG/DL (ref 70–99)
HDLC SERPL-MCNC: 66 MG/DL
LDLC SERPL CALC-MCNC: 113 MG/DL
NONHDLC SERPL-MCNC: 120 MG/DL
TRIGL SERPL-MCNC: 37 MG/DL
VIT D+METAB SERPL-MCNC: 23 NG/ML (ref 20–50)

## 2024-03-20 NOTE — RESULT ENCOUNTER NOTE
Lensa, your test results were within normal limits.  Cholesterol is at goal for you.  Vitamin D level is normal.  Glucose is normal, you do not have diabetes.     Please do not hesitate to call us at [phone] if you have any questions or concerns.    Thank you,    Sayra Tariq MD MPH

## 2024-04-16 DIAGNOSIS — H65.193 ACUTE EFFUSION OF BOTH MIDDLE EARS: ICD-10-CM

## 2024-04-17 RX ORDER — FLUTICASONE PROPIONATE 50 MCG
SPRAY, SUSPENSION (ML) NASAL
Qty: 48 G | Refills: 2 | Status: SHIPPED | OUTPATIENT
Start: 2024-04-17

## 2024-07-22 ENCOUNTER — OFFICE VISIT (OUTPATIENT)
Dept: AUDIOLOGY | Facility: CLINIC | Age: 39
End: 2024-07-22
Payer: COMMERCIAL

## 2024-07-22 DIAGNOSIS — H90.12 CONDUCTIVE HEARING LOSS OF LEFT EAR WITH UNRESTRICTED HEARING OF RIGHT EAR: Primary | ICD-10-CM

## 2024-07-22 PROCEDURE — 92567 TYMPANOMETRY: CPT | Performed by: AUDIOLOGIST

## 2024-07-22 PROCEDURE — 92557 COMPREHENSIVE HEARING TEST: CPT | Performed by: AUDIOLOGIST

## 2024-07-22 NOTE — PROGRESS NOTES
AUDIOLOGY REPORT    SUMMARY: Audiology visit completed. See audiogram for results.    RECOMMENDATIONS: Follow-up with ENT.    Luis F Napoles  Doctor of Audiology  [place] License # [id number]

## 2024-07-25 NOTE — PROGRESS NOTES
No chief complaint on file.     PCP: Sayra Tariq     Referring Provider: No ref. provider found    There were no vitals taken for this visit.    ENT Problem List:  There is no problem list on file for this patient.     Current Medications:  Current Outpatient Medications   Medication Sig Dispense Refill    desogestrel-ethinyl estradiol (APRI) 0.15-30 MG-MCG tablet Take 1 tablet by mouth daily 84 tablet 4    fluticasone (FLONASE) 50 MCG/ACT nasal spray SHAKE LIQUID AND USE 1 TO 2 SPRAYS IN EACH NOSTRIL DAILY FOR 2 TO 3 WEEKS 48 g 2     No current facility-administered medications for this visit.     HPI  Pleasant 38 year old female presents today as a new patient for acute effusion of both middle ears. She has been having this issue for about 3 years, with no resolution. She says that she has fluid in her ear and a history of a perforation on the left side for seven years. She as a sharp pain in her ear that does go away, and occasional tinnitus. She is taking a nasal spray, but does not feel that it is helpful. She denies imbalance or room-spinning sensations. She has no prior history of ear surgeries. She says that her nose breathing is normal.     Review of Systems   Constitutional: Negative.    HENT:  Positive for ear pain and tinnitus.    Eyes: Negative.    Gastrointestinal: Negative.    Skin: Negative.    Neurological: Negative.  Negative for dizziness and weakness.   Endo/Heme/Allergies: Negative.    Psychiatric/Behavioral: Negative.     All other systems reviewed and are negative.      Physical Exam  Vitals and nursing note reviewed.   Constitutional:       Appearance: Normal appearance.   HENT:      Head: Normocephalic and atraumatic.      Right Ear: Ear canal and external ear normal. No middle ear effusion. Tympanic membrane is scarred.      Left Ear: Ear canal and external ear normal.  No middle ear effusion. Tympanic membrane is perforated.      Ears:        Nose: Congestion present.      Right  Turbinates: Swollen.      Left Turbinates: Swollen.      Mouth/Throat:      Mouth: Mucous membranes are moist.      Pharynx: Oropharynx is clear. Uvula midline.   Eyes:      Extraocular Movements: Extraocular movements intact.      Pupils: Pupils are equal, round, and reactive to light.   Neurological:      Mental Status: She is alert.   Psychiatric:         Behavior: Behavior is cooperative.     + no signs of infection      AUDIOGRAM: The patient underwent an audiogram 07/22/2024.  Results: hearing WNL right. WNL to mold conductive loss left. 100% word rec bilaterally. Right tymp WNL. Flat tymp large volume left.  Right: Speech reception threshold is 5 dB with 100% word recognition. Tympanogram -- type.  Left: Speech reception threshold is 20 dB with 100% word recognition. Tympanogram -- type.    A/P   This pleasant patient is having eustachian tube dysfunction and nasal congestion that may be complicated by bilateral turbinate hypertrophy. Audiogram/Imaging was independently reviewed and discussed in detail with the patient, which shown some mild hearing loss on the left side. Physical examination shown a left ear perforation. Options including topical nasal steroid and antihistaminic Azelastine spray, surgical options including tympanoplasty. It has been decided to monitor the perforation and reassess at the next appointment. She has been advised to follow dry ear precautions.      Follow up in clinic in 3-months.    Scribe/Staff:    Scribe Disclosure:   I, Carol Ann Nice, am serving as a scribe; to document services personally performed by Jeffery Garcia MD based on data collection and the provider's statements to me.     Provider Disclosure:  I agree with above History, Review of Systems, Physical exam and Plan.  I have reviewed the content of the documentation and have edited it as needed. I have personally performed the services documented here and the documentation accurately represents those  services and the decisions I have made.      Electronically signed by:  Jeffery Garcia MD

## 2024-07-26 ENCOUNTER — OFFICE VISIT (OUTPATIENT)
Dept: OTOLARYNGOLOGY | Facility: CLINIC | Age: 39
End: 2024-07-26
Payer: COMMERCIAL

## 2024-07-26 DIAGNOSIS — H66.12 CHRONIC TUBOTYMPANIC SUPPURATIVE OTITIS MEDIA OF LEFT EAR: ICD-10-CM

## 2024-07-26 DIAGNOSIS — H69.93 DYSFUNCTION OF BOTH EUSTACHIAN TUBES: Primary | ICD-10-CM

## 2024-07-26 PROCEDURE — 99203 OFFICE O/P NEW LOW 30 MIN: CPT | Performed by: OTOLARYNGOLOGY

## 2024-07-26 RX ORDER — AZELASTINE 1 MG/ML
2 SPRAY, METERED NASAL 2 TIMES DAILY
Qty: 30 ML | Refills: 1 | Status: SHIPPED | OUTPATIENT
Start: 2024-07-26

## 2024-07-26 ASSESSMENT — ENCOUNTER SYMPTOMS
NEUROLOGICAL NEGATIVE: 1
WEAKNESS: 0
GASTROINTESTINAL NEGATIVE: 1
EYES NEGATIVE: 1
CONSTITUTIONAL NEGATIVE: 1
DIZZINESS: 0
PSYCHIATRIC NEGATIVE: 1

## 2024-07-26 NOTE — LETTER
7/26/2024      Richard Dudley  [address] [address]      Dear Colleague,    Thank you for referring your patient, Richard Dudley, to the [facility]. Please see a copy of my visit note below.    No chief complaint on file.     PCP: Sayra Tariq     Referring Provider: No ref. provider found    There were no vitals taken for this visit.    ENT Problem List:  There is no problem list on file for this patient.     Current Medications:  Current Outpatient Medications   Medication Sig Dispense Refill     desogestrel-ethinyl estradiol (APRI) 0.15-30 MG-MCG tablet Take 1 tablet by mouth daily 84 tablet 4     fluticasone (FLONASE) 50 MCG/ACT nasal spray SHAKE LIQUID AND USE 1 TO 2 SPRAYS IN EACH NOSTRIL DAILY FOR 2 TO 3 WEEKS 48 g 2     No current facility-administered medications for this visit.     HPI  Pleasant 38 year old female presents today as a new patient for acute effusion of both middle ears. She has been having this issue for about 3 years, with no resolution. She says that she has fluid in her ear and a history of a perforation on the left side for seven years. She as a sharp pain in her ear that does go away, and occasional tinnitus. She is taking a nasal spray, but does not feel that it is helpful. She denies imbalance or room-spinning sensations. She has no prior history of ear surgeries. She says that her nose breathing is normal.     Review of Systems   Constitutional: Negative.    HENT:  Positive for ear pain and tinnitus.    Eyes: Negative.    Gastrointestinal: Negative.    Skin: Negative.    Neurological: Negative.  Negative for dizziness and weakness.   Endo/Heme/Allergies: Negative.    Psychiatric/Behavioral: Negative.     All other systems reviewed and are negative.      Physical Exam  Vitals and nursing note reviewed.   Constitutional:       Appearance: Normal appearance.   HENT:      Head: Normocephalic and atraumatic.      Right Ear: Ear canal and external  ear normal. No middle ear effusion. Tympanic membrane is scarred.      Left Ear: Ear canal and external ear normal.  No middle ear effusion. Tympanic membrane is perforated.      Ears:        Nose: Congestion present.      Right Turbinates: Swollen.      Left Turbinates: Swollen.      Mouth/Throat:      Mouth: Mucous membranes are moist.      Pharynx: Oropharynx is clear. Uvula midline.   Eyes:      Extraocular Movements: Extraocular movements intact.      Pupils: Pupils are equal, round, and reactive to light.   Neurological:      Mental Status: She is alert.   Psychiatric:         Behavior: Behavior is cooperative.     + no signs of infection      AUDIOGRAM: The patient underwent an audiogram 07/22/2024.  Results: hearing WNL right. WNL to mold conductive loss left. 100% word rec bilaterally. Right tymp WNL. Flat tymp large volume left.  Right: Speech reception threshold is 5 dB with 100% word recognition. Tympanogram -- type.  Left: Speech reception threshold is 20 dB with 100% word recognition. Tympanogram -- type.    A/P   This pleasant patient is having eustachian tube dysfunction and nasal congestion that may be complicated by bilateral turbinate hypertrophy. Audiogram/Imaging was independently reviewed and discussed in detail with the patient, which shown some mild hearing loss on the left side. Physical examination shown a left ear perforation. Options including topical nasal steroid and antihistaminic Azelastine spray, surgical options including tympanoplasty. It has been decided to monitor the perforation and reassess at the next appointment. She has been advised to follow dry ear precautions.      Follow up in clinic in 3-months.    Scribe/Staff:    Scribe Disclosure:   I, Carol Ann Nice, am serving as a scribe; to document services personally performed by Jeffery Garcia MD based on data collection and the provider's statements to me.     Provider Disclosure:  I agree with above History, Review  of Systems, Physical exam and Plan.  I have reviewed the content of the documentation and have edited it as needed. I have personally performed the services documented here and the documentation accurately represents those services and the decisions I have made.      Electronically signed by:  Jeffery Garcia MD         Again, thank you for allowing me to participate in the care of your patient.        Sincerely,        Jeffery Garcia MD

## 2024-07-26 NOTE — NURSING NOTE
Richard Dudley's goals for this visit include:   Chief Complaint   Patient presents with    Consult     Acute effusion of bilateral middle ears x2-3 years, no change. Feels like fluid in my ears. Treatments tried Flonase x 4 weeks then stopped.     She requests these members of her care team be copied on today's visit information: yes    PCP: Sayra Tariq    Referring Provider:  Referred Self, MD  No address on file    [facility] 07/24/2024 (Exact Date)     Do you need any medication refills at today's visit? juani Love, [facility]

## 2025-04-10 NOTE — PATIENT INSTRUCTIONS
In order to further evaluate for infertility, you will need the following tests: Keep these instructions so you are able to inform the lab of which tests you are needing at each lab visit.     -- on cycle day 21 of this cycle, go to the lab and obtain blood tests for Progesterone, Prolactin, Anti-Mullerian Hormone and TSH (thyroid)     -- when your next period starts, around cycle day 3, go to the lab and have blood work for Estradiol, LH and FSH     --  on cycle day 9 or 10, you need to have a Hysterosalpingogram (HSG); this is a test done to check your uterus and tubes through the diagnostic imaging department     --  your partner needs to obtain and submit a semen sample for Semen Analysis (best if 3 days of no ejaculation prior, and keep the specimen warm until it arrives at the specialized lab). Please have your partner discuss this with his primary care provider.      -- you should be taking a vitamin with Folic Acid to prevent birth defects.     -- make a follow up appointment with Dr. Puga after all the above testing is done in order to review results and discuss plans moving forward                                                                If you have labs or imaging done, the results will automatically release in Clinical Data without an interpretation.  Your health care professional will review those results and send an interpretation with recommendations as soon as possible, but this may be 1-3 business days.    If you have any questions regarding your visit, please contact your care team.     [employer] [facility]: [phone]  Women s Health CLINIC HOURS TELEPHONE NUMBER   Bernabe RAE Puga DO.      Jennifer-EL [facility]field-EL Gandhi-Surgery Scheduler  Alyson-         Monday-Bethany Moore  8:00 am-4:00 pm  [facility]  8:00 am-4:00 pm  Wednesday-Bethany Moore 8:00 am-4:00 pm  [facility] 8:00 am-4:00 pm    Typical Surgery Day Friday [facility]  [place]  [address] [place] [address]  PH: [phone] Fax    Imaging Scheduling all locations  PH: [phone]     [facility]  [address] [place] [address]  [phone]    [facility]  [address] [place]  [place] [address]  PH: [phone]     **Surgeries** Our Surgery Schedulers will contact you to schedule. If you do not receive a call within 3 business days, please call [phone].  Urgent Care locations:  [facility]       Monday-Friday   10 am - 8 pm  Saturday and Sunday   9 am - 5 pm   [phone]   If you need a medication refill, please contact your pharmacy. Please allow 3 business days for your refill to be completed.  As always, Thank you for trusting us with your healthcare needs!

## 2025-04-17 ENCOUNTER — OFFICE VISIT (OUTPATIENT)
Dept: OBGYN | Facility: CLINIC | Age: 40
End: 2025-04-17
Attending: PREVENTIVE MEDICINE
Payer: COMMERCIAL

## 2025-04-17 VITALS
OXYGEN SATURATION: 100 % | HEART RATE: 64 BPM | WEIGHT: 105.2 LBS | BODY MASS INDEX: 19.24 KG/M2 | DIASTOLIC BLOOD PRESSURE: 64 MMHG | SYSTOLIC BLOOD PRESSURE: 100 MMHG

## 2025-04-17 DIAGNOSIS — N97.9 FEMALE FERTILITY PROBLEM: ICD-10-CM

## 2025-04-17 LAB
ESTRADIOL SERPL-MCNC: 9 PG/ML
FSH SERPL IRP2-ACNC: 18.4 MIU/ML
LH SERPL-ACNC: 6.1 MIU/ML

## 2025-04-17 PROCEDURE — 82670 ASSAY OF TOTAL ESTRADIOL: CPT | Performed by: GENERAL PRACTICE

## 2025-04-17 PROCEDURE — 36415 COLL VENOUS BLD VENIPUNCTURE: CPT | Performed by: GENERAL PRACTICE

## 2025-04-17 PROCEDURE — 83001 ASSAY OF GONADOTROPIN (FSH): CPT | Performed by: GENERAL PRACTICE

## 2025-04-17 PROCEDURE — 99203 OFFICE O/P NEW LOW 30 MIN: CPT | Performed by: GENERAL PRACTICE

## 2025-04-17 PROCEDURE — 83002 ASSAY OF GONADOTROPIN (LH): CPT | Performed by: GENERAL PRACTICE

## 2025-04-17 NOTE — PROGRESS NOTES
SUBJECTIVE:       HPI: Richard Frank is a 39 year old  who presents today for consultation for female infertility. TTC for 6 months.     LMP 25    Female factor:    General health: Good  Menstrual history:  Menses every 28-30 days, lasting 5-6 days. 3 days heavy. Denies dysmenorrhea  Pap history: Last pap was 2023- NILM/HPV-  Prior pregnancies:  1 prior term pregnancy with current partner. Was TTC  for > 1 year and conceived spontaneously.  Previous infertility work up:  No  Most recent form of birth control:  OCPs, stopped 6 months ago  History of STI:  No  Ovulation predictor kits: No  Timed intercourse: No  Tubal study done?:  No    Occupation of Patient: Adult care caretaker  Chemical or toxin exposure at home or work: No      Male factor:   General health: Healthy  Father of prior pregnancies:  Yes- one with patient, and another child with a different partner  History of testicular trauma or mumps:  Semen Analysis: No      Occupation of Partner: Self employed   Does not smoke  Chemical or toxin exposure at home or work: No      Ob Hx:   OB History    Para Term  AB Living   1 1 1 0 0 1   SAB IAB Ectopic Multiple Live Births   0 0 0 0 1      # Outcome Date GA Lbr Dilshad/2nd Weight Sex Type Anes PTL Lv   1 Term 04/10/19 40w4d  2.637 kg (5 lb 13 oz) F CS-LTranv EPI, Spinal N LOLY      Birth Comments: Unscheduled primary LTCS; Arrest of descent, NRFHT's with deep variable decelerations, OP presentation, Meconium stained fluid      Complications: Failure to Progress in Second Stage      Name: CONSTANZA FRANK      Apgar1: 8  Apgar5: 9              Problem list and histories reviewed & adjusted, as indicated.  Additional history: as documented.    There is no problem list on file for this patient.    History reviewed. No pertinent surgical history.   Social History     Tobacco Use    Smoking status: Never    Smokeless tobacco: Never   Substance Use Topics    Alcohol use: No      No  data available              No current outpatient medications on file.     No current facility-administered medications for this visit.     No Known Allergies    ROS:  10 Point review of systems negative other noted above in HPI    OBJECTIVE:     /64   Pulse 64   Wt 47.7 kg (105 lb 3.2 oz)   LMP 2025 (Exact Date)   SpO2 100%   BMI 19.24 kg/m    Body mass index is 19.24 kg/m .    Gen: Alert, oriented, appropriately interactive, NAD  Eyes: Eyes grossly normal to inspection, conjunctivae and sclerae normal  Neck: soft, no cervical adenopathy, no masses  CV: No edema  Resp: no audible wheeze, cough, or visible cyanosis.  No visible retractions or increased work of breathing.  Able to speak fully in complete sentences.  Abdomen: soft, non tender, non distended, no masses, no hernias.   External genitalia: no lesions; normal appearing external genitalia, bartholins glands, urethra, skenes glands  Vagina: no masses or lesions or discharge, normally rugated.  Cervix: no masses or lesions or discharge   Bimanual exam:   Nontender pelvic floor muscles  Urethra: nontender   Bladder: nontender and without massess, well supported   Uterus: midline, anteverted, small, mobile  no masses, non-tender  Adnexa: no masses or tenderness appreciated   No cervical motion tenderness  MSK: normal gait, symmetric movements UE & LE  Lower extremities: non-tender, no edema  Neuro: Cranial nerves grossly intact, mentation intact and speech normal  Psych: mentation appears normal, affect normal/bright, judgement and insight intact, normal speech and appearance well-groomed     In-Clinic Test Results:  No results found for this or any previous visit (from the past 24 hours).    ASSESSMENT/PLAN:                                                      Richard Dudley is a 39 year old  who presents today for consultation for female infertility      ICD-10-CM    1. Female fertility problem  N97.9 Ob/Gyn  Referral      Mullerian Hormone Antibody     Follicle stimulating hormone     Progesterone     Prolactin     Testosterone Free and Total     TSH with free T4 reflex     US Pelvic Transabdominal and Transvaginal     XR Hysterosalpingogram     Estradiol     Luteinizing Hormone     Follicle stimulating hormone     Estradiol     Luteinizing Hormone          Discussed male and female factors of infertility.  I discussed the association of regular ovulation and regular menstruation.  Discussed possible testing including semen analysis, laboratory evaluation of ovulation, and evaluation of uterine/tubal anatomy. 80% of couples will achieve pregnancy in the first 6 months of trying, and infertility work-up is not typically necessary until 12 months of trying without pregnancy. Furthermore, discussed using ovulation kits, tracking cervical mucous and phone mireya for tracking ovulation. Discussed the fertile window and timing of intercourse.     We also discussed her health history, including medications and exposures to toxins.     Plan at this time is to obtain fertility labs as above s well as pelvic ultrasound and HSG. Instruction printed for patient regarding what days to complete labs and imaging. Patient to return to clinic once workup is complete and partner has completed a semen analysis. Will review all rsults at that time to formulate plan of care.   She was given the opportunity to ask questions and have them answered.    30 minutes spent on the date of the encounter doing chart review, review of test results, patient visit, and documentation       Bernabe Puga,   [facility]

## 2025-04-22 ENCOUNTER — ANCILLARY PROCEDURE (OUTPATIENT)
Dept: ULTRASOUND IMAGING | Facility: CLINIC | Age: 40
End: 2025-04-22
Attending: GENERAL PRACTICE
Payer: COMMERCIAL

## 2025-04-22 DIAGNOSIS — N97.9 FEMALE FERTILITY PROBLEM: ICD-10-CM

## 2025-04-22 PROCEDURE — 76830 TRANSVAGINAL US NON-OB: CPT

## 2025-04-22 PROCEDURE — 76856 US EXAM PELVIC COMPLETE: CPT

## 2025-05-05 ENCOUNTER — LAB (OUTPATIENT)
Dept: LAB | Facility: CLINIC | Age: 40
End: 2025-05-05
Payer: COMMERCIAL

## 2025-05-05 DIAGNOSIS — N97.9 FEMALE FERTILITY PROBLEM: ICD-10-CM

## 2025-05-05 LAB
MIS SERPL-MCNC: 0.5 NG/ML (ref 0.15–7.5)
PROGEST SERPL-MCNC: 2.9 NG/ML
PROLACTIN SERPL 3RD IS-MCNC: 12 NG/ML (ref 5–23)
SHBG SERPL-SCNC: 126 NMOL/L (ref 30–135)
TSH SERPL DL<=0.005 MIU/L-ACNC: 2.68 UIU/ML (ref 0.3–4.2)

## 2025-05-05 PROCEDURE — 82166 ASSAY ANTI-MULLERIAN HORM: CPT

## 2025-05-05 PROCEDURE — 84443 ASSAY THYROID STIM HORMONE: CPT

## 2025-05-05 PROCEDURE — 84144 ASSAY OF PROGESTERONE: CPT

## 2025-05-05 PROCEDURE — 36415 COLL VENOUS BLD VENIPUNCTURE: CPT

## 2025-05-05 PROCEDURE — 84146 ASSAY OF PROLACTIN: CPT

## 2025-05-05 PROCEDURE — 84270 ASSAY OF SEX HORMONE GLOBUL: CPT

## 2025-05-09 ENCOUNTER — RESULTS FOLLOW-UP (OUTPATIENT)
Dept: OBGYN | Facility: CLINIC | Age: 40
End: 2025-05-09

## 2025-06-23 ENCOUNTER — HOSPITAL ENCOUNTER (OUTPATIENT)
Dept: RADIOLOGY | Facility: HOSPITAL | Age: 40
Discharge: HOME | End: 2025-06-23
Attending: GENERAL PRACTICE
Payer: COMMERCIAL

## 2025-06-23 ENCOUNTER — APPOINTMENT (OUTPATIENT)
Dept: LAB | Facility: HOSPITAL | Age: 40
End: 2025-06-23
Attending: GENERAL PRACTICE
Payer: COMMERCIAL

## 2025-06-23 DIAGNOSIS — N97.9 FEMALE FERTILITY PROBLEM: Primary | ICD-10-CM

## 2025-06-23 LAB — HCG UR QL: NEGATIVE

## 2025-06-23 PROCEDURE — 58340 CATHETER FOR HYSTEROGRAPHY: CPT

## 2025-06-23 PROCEDURE — 81025 URINE PREGNANCY TEST: CPT | Performed by: GENERAL PRACTICE
